# Patient Record
(demographics unavailable — no encounter records)

---

## 2024-10-14 NOTE — HISTORY OF PRESENT ILLNESS
[de-identified] : Body part: lower back, S/P Laminoforaminotomy Discectomy on 6/26/24 Better/ Worse/ Same since last visit: better Treatments since last visit: HEP Difficulty with: muscle spasms Radicular symptoms: none Current medications for pain: Cyclobenzaprine and Tylenol PRN Assistive walking device: none   Today's Pain: 2/10   Current work status (if Workers Comp): full time, no restrictions

## 2024-10-14 NOTE — PHYSICAL EXAM
[TextEntry] : Constitutional: Well groomed and developed.  Respiratory: Normal, unlabored breathing. No use of accessory muscles.  Skin: No rashes or ulcers. Skin warm and dry.  Psychiatric: Oriented to time, place, person and event. No acute distress. Gait: Heel to toe Patient able to walk on toes and heels.   Well healed cicatrix   Lumbar spine:  Posture: Normal on coronal and sagittal alignment  AROM:  Flexion 80 Extension 30  Tenderness:  Thoracic: No tenderness on palpation  Lumbar: Mild tenderness on palpation    Mild left paraspinal muscle spasm Sacrum/coccyx: no tenderness on palpation  Greater trochanteric bursa:  No tenderness  PSIS: None    Motor:                         R             L LE:                                IS                    5             5 Quad              5             5 TA                  5             5 EHL                5             5 Gastroc         5             5                 R  L DTR: Patella  2+  2+ Achilles  2+  2+  Sensory: Light touch sensation intact T12-S1  Babinski's Sign: Negative bilaterally  Straight leg raise test: Negative bilaterally JOSEPH test: negative bilaterally

## 2024-10-14 NOTE — ASSESSMENT
[FreeTextEntry1] : PREOP 4/2024 MRI of L-Spine was reviewed today and is as follows:  DDD L4-5 Mild stenosis L4-5 Bilateral L>R foraminal stenosis L4-5 Small HNP left L5-S1  37 y/o patient presents today following lumbar spine work injury on 8/4/23, S/P Lumbar spine and Laminoforaminotomy Discectomy on 6/26.  Patient with significant improvements in pain and radiculopathy since surgery. Mild spasm intermittently. Overall satisfied with the surgical outcome.    - Patient will continue HEP as detailed in home exercise booklet given in office. Emphasized daily stretching and strengthening.   - Recommend NSAIDs, muscle relaxers PRN - Recommend heating pad use to decrease muscle spasm - Discussed the importance of home exercises, including but not limited to hamstring stretching and core strengthening  Patient was educated on their diagnosis today. All questions answered and patient expressed understanding.  Worker's compensation 0% disabled  Follow up in 3 months

## 2025-01-06 NOTE — HISTORY OF PRESENT ILLNESS
[de-identified] : Body part: lower back Better/ Worse/ Same since last visit: better Treatments since last visit: HEP Difficulty with: intermittent left hip pain when lying down Radicular symptoms: none Current medications for pain: Cyclobenzaprine and Tylenol PRN Assistive walking device: none   Today's Pain: 1/10   Current work status (if Workers Comp): full time, no restrictions

## 2025-01-06 NOTE — ASSESSMENT
[FreeTextEntry1] : PREOP 4/2024 MRI of L-Spine was reviewed today and is as follows:  DDD L4-5 Mild stenosis L4-5 Bilateral L>R foraminal stenosis L4-5 Small HNP left L5-S1  37 y/o patient presents today following lumbar spine work injury on 8/4/23, S/P transpedicular decompression L4-5 on 6/26/24.  Patient with continued improvements, intermittent pain, satisfied with surgical outcome.    - Patient will continue HEP as detailed in home exercise booklet given in office. Emphasized daily stretching and strengthening.   - Recommend NSAIDs, muscle relaxers PRN - Recommend heating pad use to decrease muscle spasm - Discussed the importance of home exercises, including but not limited to hamstring stretching and core strengthening  Patient was educated on their diagnosis today. All questions answered and patient expressed understanding.  Worker's compensation 0% disabled  Follow up in 2 months

## 2025-03-17 NOTE — HISTORY OF PRESENT ILLNESS
[de-identified] : Body part: lower back Better/ Worse/ Same since last visit: better Treatments since last visit: HEP Difficulty with: N/A Radicular symptoms: none Current medications for pain: Cyclobenzaprine and Tylenol PRN Assistive walking device: none   Today's Pain: 2/10   Current work status (if Workers Comp): full time, no restrictions

## 2025-03-17 NOTE — HISTORY OF PRESENT ILLNESS
[de-identified] : Body part: lower back Better/ Worse/ Same since last visit: better Treatments since last visit: HEP Difficulty with: N/A Radicular symptoms: none Current medications for pain: Cyclobenzaprine and Tylenol PRN Assistive walking device: none   Today's Pain: 2/10   Current work status (if Workers Comp): full time, no restrictions

## 2025-03-17 NOTE — ASSESSMENT
[FreeTextEntry1] : PREOP 4/2024 MRI of L-Spine was reviewed today and is as follows:  DDD L4-5 Mild stenosis L4-5 Bilateral L>R foraminal stenosis L4-5 Small HNP left L5-S1  37 y/o patient presents today following lumbar spine work injury on 8/4/23, S/P transpedicular decompression L4-5 on 6/26/24.  Patient with continued improvements, intermittent pain, satisfied with surgical outcome. He is with continued resolution of radicular symptoms, mild muscle spasm on exam today. Overall continues to improve.    - Patient will continue HEP as detailed in home exercise booklet given in office. Emphasized daily stretching and strengthening.   - Recommend NSAIDs, muscle relaxers PRN - Recommend heating pad use to decrease muscle spasm - Discussed the importance of home exercises, including but not limited to hamstring stretching and core strengthening  Patient was educated on their diagnosis today. All questions answered and patient expressed understanding.  Worker's compensation 0% disabled  Follow up in 3 months

## 2025-06-09 NOTE — HISTORY OF PRESENT ILLNESS
[de-identified] : Body part: lower back Better/ Worse/ Same since last visit: slightly worse Treatments since last visit: HEP Difficulty with: prolonged sitting, working out (leg day) Radicular symptoms: down the left leg to the foot, denies numbness/tingling Current medications for pain: Cyclobenzaprine and Tylenol PRN Assistive walking device: none   Today's Pain: 4/10   Current work status (if Workers Comp): full time, no restrictions

## 2025-06-09 NOTE — ASSESSMENT
[FreeTextEntry1] : PREOP 4/2024 MRI of L-Spine was reviewed today and is as follows:  DDD L4-5 Mild stenosis L4-5 Bilateral L>R foraminal stenosis L4-5 Small HNP left L5-S1  37 y/o patient presents today following lumbar spine work injury on 8/4/23, S/P transpedicular decompression L4-5 on 6/26/24. Has continued improvements in strength but now with increase in LLE radicular symptoms, likely consistent with nerve irritation due to small left HNP at L5-S1. He is a candidate for injection for relief.   - Proceed with left L5-S1 transforaminal epidural steroid injection at [facility]    - Patient will continue HEP as detailed in home exercise booklet given in office. Emphasized daily stretching and strengthening.   - Recommend NSAIDs, muscle relaxers PRN - Recommend heating pad use to decrease muscle spasm - Discussed the importance of home exercises, including but not limited to hamstring stretching and core strengthening  Patient was educated on their diagnosis today. All questions answered and patient expressed understanding.  Worker's compensation 0% disabled  Follow up in 2 weeks after injection